# Patient Record
Sex: MALE | Race: BLACK OR AFRICAN AMERICAN | NOT HISPANIC OR LATINO
[De-identification: names, ages, dates, MRNs, and addresses within clinical notes are randomized per-mention and may not be internally consistent; named-entity substitution may affect disease eponyms.]

---

## 2018-01-10 PROBLEM — Z00.00 ENCOUNTER FOR PREVENTIVE HEALTH EXAMINATION: Status: ACTIVE | Noted: 2018-01-10

## 2018-01-16 ENCOUNTER — APPOINTMENT (OUTPATIENT)
Dept: CARDIOLOGY | Facility: CLINIC | Age: 63
End: 2018-01-16
Payer: COMMERCIAL

## 2018-01-16 VITALS
SYSTOLIC BLOOD PRESSURE: 130 MMHG | DIASTOLIC BLOOD PRESSURE: 68 MMHG | WEIGHT: 190 LBS | BODY MASS INDEX: 28.14 KG/M2 | HEART RATE: 64 BPM | HEIGHT: 69 IN

## 2018-01-16 DIAGNOSIS — Z86.79 PERSONAL HISTORY OF OTHER DISEASES OF THE CIRCULATORY SYSTEM: ICD-10-CM

## 2018-01-16 DIAGNOSIS — Z87.19 PERSONAL HISTORY OF OTHER DISEASES OF THE DIGESTIVE SYSTEM: ICD-10-CM

## 2018-01-16 DIAGNOSIS — Z86.39 PERSONAL HISTORY OF OTHER ENDOCRINE, NUTRITIONAL AND METABOLIC DISEASE: ICD-10-CM

## 2018-01-16 DIAGNOSIS — Z87.891 PERSONAL HISTORY OF NICOTINE DEPENDENCE: ICD-10-CM

## 2018-01-16 PROCEDURE — 99214 OFFICE O/P EST MOD 30 MIN: CPT

## 2018-01-16 RX ORDER — COLCHICINE 0.6 MG/1
0.6 CAPSULE ORAL
Refills: 0 | Status: ACTIVE | COMMUNITY

## 2018-01-29 ENCOUNTER — APPOINTMENT (OUTPATIENT)
Dept: CARDIOLOGY | Facility: CLINIC | Age: 63
End: 2018-01-29
Payer: COMMERCIAL

## 2018-01-29 PROCEDURE — 93880 EXTRACRANIAL BILAT STUDY: CPT

## 2018-01-29 PROCEDURE — 93306 TTE W/DOPPLER COMPLETE: CPT

## 2018-01-30 ENCOUNTER — APPOINTMENT (OUTPATIENT)
Dept: CARDIOLOGY | Facility: CLINIC | Age: 63
End: 2018-01-30
Payer: COMMERCIAL

## 2018-01-30 VITALS
SYSTOLIC BLOOD PRESSURE: 146 MMHG | HEIGHT: 69 IN | HEART RATE: 58 BPM | WEIGHT: 191 LBS | DIASTOLIC BLOOD PRESSURE: 64 MMHG | BODY MASS INDEX: 28.29 KG/M2

## 2018-01-30 PROCEDURE — 99214 OFFICE O/P EST MOD 30 MIN: CPT

## 2019-01-08 ENCOUNTER — APPOINTMENT (OUTPATIENT)
Dept: CARDIOLOGY | Facility: CLINIC | Age: 64
End: 2019-01-08

## 2019-01-08 ENCOUNTER — MEDICATION RENEWAL (OUTPATIENT)
Age: 64
End: 2019-01-08

## 2019-01-29 ENCOUNTER — APPOINTMENT (OUTPATIENT)
Dept: CARDIOLOGY | Facility: CLINIC | Age: 64
End: 2019-01-29
Payer: COMMERCIAL

## 2019-01-29 ENCOUNTER — NON-APPOINTMENT (OUTPATIENT)
Age: 64
End: 2019-01-29

## 2019-01-29 VITALS
OXYGEN SATURATION: 94 % | WEIGHT: 190 LBS | SYSTOLIC BLOOD PRESSURE: 142 MMHG | HEART RATE: 64 BPM | BODY MASS INDEX: 28.14 KG/M2 | DIASTOLIC BLOOD PRESSURE: 70 MMHG | HEIGHT: 69 IN

## 2019-01-29 PROCEDURE — 99214 OFFICE O/P EST MOD 30 MIN: CPT

## 2019-01-29 PROCEDURE — 93306 TTE W/DOPPLER COMPLETE: CPT

## 2019-01-29 PROCEDURE — 93000 ELECTROCARDIOGRAM COMPLETE: CPT

## 2019-01-29 NOTE — ASSESSMENT
[FreeTextEntry1] : Results of echocardiogram  discussed with the patient. Patient had mitral valve prolapse. There is moderate mitral insufficiency. There is mild pulmonary hypertension. Patient is asymptomatic.  Cardiac followup yearly and as needed.

## 2019-01-29 NOTE — PHYSICAL EXAM
[General Appearance - Well Developed] : well developed [Normal Appearance] : normal appearance [Well Groomed] : well groomed [General Appearance - Well Nourished] : well nourished [No Deformities] : no deformities [General Appearance - In No Acute Distress] : no acute distress [Normal Conjunctiva] : the conjunctiva exhibited no abnormalities [Eyelids - No Xanthelasma] : the eyelids demonstrated no xanthelasmas [Normal Oral Mucosa] : normal oral mucosa [No Oral Pallor] : no oral pallor [No Oral Cyanosis] : no oral cyanosis [Normal Jugular Venous A Waves Present] : normal jugular venous A waves present [Normal Jugular Venous V Waves Present] : normal jugular venous V waves present [No Jugular Venous Nicole A Waves] : no jugular venous nicole A waves [Respiration, Rhythm And Depth] : normal respiratory rhythm and effort [Exaggerated Use Of Accessory Muscles For Inspiration] : no accessory muscle use [Auscultation Breath Sounds / Voice Sounds] : lungs were clear to auscultation bilaterally [Heart Rate And Rhythm] : heart rate and rhythm were normal [Heart Sounds] : normal S1 and S2 [Murmurs] : no murmurs present [Abdomen Soft] : soft [Abdomen Tenderness] : non-tender [Abdomen Mass (___ Cm)] : no abdominal mass palpated [Abnormal Walk] : normal gait [Gait - Sufficient For Exercise Testing] : the gait was sufficient for exercise testing [Nail Clubbing] : no clubbing of the fingernails [Cyanosis, Localized] : no localized cyanosis [Petechial Hemorrhages (___cm)] : no petechial hemorrhages [Skin Color & Pigmentation] : normal skin color and pigmentation [] : no rash [No Venous Stasis] : no venous stasis [Skin Lesions] : no skin lesions [No Skin Ulcers] : no skin ulcer [No Xanthoma] : no  xanthoma was observed [Oriented To Time, Place, And Person] : oriented to person, place, and time [Affect] : the affect was normal [Mood] : the mood was normal [No Anxiety] : not feeling anxious

## 2019-01-29 NOTE — HISTORY OF PRESENT ILLNESS
[FreeTextEntry1] : The patient is presenting here today for cardiac follow up.  The patient is 63 years old male with a history of hypertension and hyperlipidemia. Patient had atypical chest pain in the past, patient had nuclear stress test in the past which revealed no evidence of ischemia. Patient is physically active.Patient is here to review results of echocardiogram .

## 2020-01-28 ENCOUNTER — APPOINTMENT (OUTPATIENT)
Dept: CARDIOLOGY | Facility: CLINIC | Age: 65
End: 2020-01-28

## 2020-02-27 ENCOUNTER — NON-APPOINTMENT (OUTPATIENT)
Age: 65
End: 2020-02-27

## 2020-02-27 ENCOUNTER — APPOINTMENT (OUTPATIENT)
Dept: CARDIOLOGY | Facility: CLINIC | Age: 65
End: 2020-02-27
Payer: MEDICARE

## 2020-02-27 VITALS
WEIGHT: 188 LBS | HEART RATE: 69 BPM | BODY MASS INDEX: 27.85 KG/M2 | HEIGHT: 69 IN | OXYGEN SATURATION: 93 % | DIASTOLIC BLOOD PRESSURE: 60 MMHG | SYSTOLIC BLOOD PRESSURE: 120 MMHG

## 2020-02-27 PROCEDURE — 99214 OFFICE O/P EST MOD 30 MIN: CPT

## 2020-02-27 PROCEDURE — 93000 ELECTROCARDIOGRAM COMPLETE: CPT

## 2020-02-27 RX ORDER — IBUPROFEN 800 MG/1
800 TABLET, FILM COATED ORAL
Refills: 0 | Status: DISCONTINUED | COMMUNITY
End: 2020-02-27

## 2020-02-27 NOTE — HISTORY OF PRESENT ILLNESS
[FreeTextEntry1] : Mr. Tapia is a 64 year old male that came in today 2-27-20 for routine follow up. \par \par He likes to ride his bike when the weather is nice, but has been limited. He last rode his bike last week 15-30minutes. Asymptotic. \par He does side  work and states he is constantly moving. \par \par He denies chest pain, sob, palpitations, dizziness, syncope, orthopnea and PND. \par \par As you know he has a history of hypertension (PCP added Norvasc aprox 6months ago due to persistent hypertension) and hyperlipidemia. Patient had atypical chest pain in the past, and f/u nuclear stress test revealed no evidence of ischemia. \par \par Labs/tests\par echocardiogram 1-29-19 ef 60% with normal ventricular function, mild diastolic dysfunction, mildly dilated LA, bileaflet MVP, mild MR, mild TR, mild NE, mild PHTN PASP 42mmhg. \par \par Carotid ultrasound mild non-obstuctive diseaes b/l\par \par Nuclear 6-13-13 pt exercised x 8min reaching 98% of MPHR with normal myocardial perfusion and wall motion study

## 2020-02-27 NOTE — ASSESSMENT
[FreeTextEntry1] : PLAN 2-27-20\par \par -CAD risk factors abnormal ekg, age, gender, hypertension and borderline dyslipidemia. Echo as above. 1-29-19 ef 60% with normal ventricular function. Nuclear stress test 6-13-13 pt exercised 8min reaching 98% of MPHR. Given his lack of formal exercis regimen and risk factors as above we have recommended an echocardiogram to further evaluate EF, ventricular wall motion, valvular function, chamber size and valvular pathology in addition to a s-echo to follow symptoms, ekg changes,  hr/bp response and peak pulmonary pressures to exertion. He is currenlty stable. \par \par -VHD with noted 1-2/6 shahab at apex. Advising echocardiogram as above to follow valvular pathology. \par \par -HTN. Controlled on meds as above. Requesting most recent labs from PCP\par \par -PVD. Carotid ultrasound as above with mild non-obstructive disease b/l. On Asa and zetia (requesting lipid panel)\par \par -requesting most recent lipid panel from PCP. Noted to be on Zetia\par \par f/u after tesing, sooner if changes in symptoms or status\par \par Sincerely,\par \par Mary Springer PA-C\par patient reviewed and plan advised by supervising physician Dr. Prater\par

## 2020-02-27 NOTE — PHYSICAL EXAM
[Normal Appearance] : normal appearance [No Deformities] : no deformities [] : no respiratory distress [Respiration, Rhythm And Depth] : normal respiratory rhythm and effort [Abdomen Tenderness] : non-tender [Bowel Sounds] : normal bowel sounds [Abdomen Soft] : soft [Mood] : the mood was normal [Skin Color & Pigmentation] : normal skin color and pigmentation [Affect] : the affect was normal [FreeTextEntry1] : no edema 2+ noted b/l l/e

## 2020-03-05 ENCOUNTER — APPOINTMENT (OUTPATIENT)
Dept: CARDIOLOGY | Facility: CLINIC | Age: 65
End: 2020-03-05
Payer: MEDICARE

## 2020-03-05 PROCEDURE — 93306 TTE W/DOPPLER COMPLETE: CPT

## 2020-08-06 ENCOUNTER — APPOINTMENT (OUTPATIENT)
Dept: CARDIOLOGY | Facility: CLINIC | Age: 65
End: 2020-08-06

## 2020-08-20 ENCOUNTER — APPOINTMENT (OUTPATIENT)
Dept: CARDIOLOGY | Facility: CLINIC | Age: 65
End: 2020-08-20

## 2020-09-01 RX ORDER — AMLODIPINE BESYLATE 10 MG/1
10 TABLET ORAL DAILY
Qty: 90 | Refills: 1 | Status: ACTIVE | COMMUNITY
Start: 1900-01-01 | End: 1900-01-01

## 2023-01-10 ENCOUNTER — APPOINTMENT (OUTPATIENT)
Dept: CARDIOLOGY | Facility: CLINIC | Age: 68
End: 2023-01-10
Payer: MEDICARE

## 2023-01-10 ENCOUNTER — NON-APPOINTMENT (OUTPATIENT)
Age: 68
End: 2023-01-10

## 2023-01-10 VITALS
OXYGEN SATURATION: 97 % | SYSTOLIC BLOOD PRESSURE: 130 MMHG | BODY MASS INDEX: 27.11 KG/M2 | WEIGHT: 183 LBS | TEMPERATURE: 98 F | HEIGHT: 69 IN | HEART RATE: 58 BPM | DIASTOLIC BLOOD PRESSURE: 70 MMHG

## 2023-01-10 DIAGNOSIS — Z01.810 ENCOUNTER FOR PREPROCEDURAL CARDIOVASCULAR EXAMINATION: ICD-10-CM

## 2023-01-10 PROCEDURE — 99214 OFFICE O/P EST MOD 30 MIN: CPT | Mod: 25

## 2023-01-10 PROCEDURE — 93000 ELECTROCARDIOGRAM COMPLETE: CPT

## 2023-01-10 RX ORDER — RANITIDINE 75 MG/1
TABLET ORAL
Refills: 0 | Status: DISCONTINUED | COMMUNITY
End: 2023-01-10

## 2023-01-10 NOTE — ASSESSMENT
[FreeTextEntry1] : Results of echocardiogram  discussed with the patient. Patient had mitral valve prolapse. There is moderate mitral insufficiency. There is mild pulmonary hypertension. Patient is asymptomatic.  Cardiac followup yearly and as needed.\par Patient is preop for colonoscopy.  ECG reviewed.  Overall patient is well compensated.  No significant risk for cardiovascular events during anticipated low risk procedure.  Hold aspirin for 7 days prior to procedure.  Restart aspirin as soon as possible after the procedure.

## 2024-01-18 ENCOUNTER — NON-APPOINTMENT (OUTPATIENT)
Age: 69
End: 2024-01-18

## 2024-01-18 ENCOUNTER — APPOINTMENT (OUTPATIENT)
Dept: CARDIOLOGY | Facility: CLINIC | Age: 69
End: 2024-01-18
Payer: MEDICARE

## 2024-01-18 VITALS
HEART RATE: 59 BPM | BODY MASS INDEX: 27.11 KG/M2 | DIASTOLIC BLOOD PRESSURE: 60 MMHG | OXYGEN SATURATION: 99 % | SYSTOLIC BLOOD PRESSURE: 136 MMHG | HEIGHT: 69 IN | WEIGHT: 183 LBS

## 2024-01-18 DIAGNOSIS — R94.31 ABNORMAL ELECTROCARDIOGRAM [ECG] [EKG]: ICD-10-CM

## 2024-01-18 PROCEDURE — 99204 OFFICE O/P NEW MOD 45 MIN: CPT | Mod: 25

## 2024-01-18 PROCEDURE — 93000 ELECTROCARDIOGRAM COMPLETE: CPT

## 2024-01-18 NOTE — REASON FOR VISIT
[CV Risk Factors and Non-Cardiac Disease] : CV risk factors and non-cardiac disease [Structural Heart and Valve Disease] : structural heart and valve disease [Hyperlipidemia] : hyperlipidemia [Hypertension] : hypertension [FreeTextEntry3] : Dr. Martell [FreeTextEntry1] : Patient is a 68-year-old white male who presents the office today for his annual cardiac evaluation.  Patient previously under the care of Dr. Prater who retired and presents today for his annual cardiac evaluation fortunately with no new or active cardiovascular complaints.

## 2024-01-18 NOTE — PHYSICAL EXAM
[Well Developed] : well developed [Well Nourished] : well nourished [No Acute Distress] : no acute distress [Normal Conjunctiva] : normal conjunctiva [Normal Venous Pressure] : normal venous pressure [No Carotid Bruit] : no carotid bruit [Normal S1, S2] : normal S1, S2 [Clear Lung Fields] : clear lung fields [No Respiratory Distress] : no respiratory distress  [Soft] : abdomen soft [Non Tender] : non-tender [No Masses/organomegaly] : no masses/organomegaly [Normal] : normal gait [No Edema] : no edema [No Cyanosis] : no cyanosis [No Focal Deficits] : no focal deficits [de-identified] : Grade 2/6 apical MR murmur with a click

## 2024-01-18 NOTE — REVIEW OF SYSTEMS
[SOB] : no shortness of breath [Dyspnea on exertion] : not dyspnea during exertion [Chest Discomfort] : no chest discomfort [Lower Ext Edema] : no extremity edema [Leg Claudication] : no intermittent leg claudication [Palpitations] : no palpitations [Orthopnea] : no orthopnea [PND] : no PND [Syncope] : no syncope [Cough] : no cough [Wheezing] : no wheezing [Coughing Up Blood] : no hemoptysis [Snoring] : snoring [Negative] : Neurological

## 2024-01-18 NOTE — CARDIOLOGY SUMMARY
[de-identified] : (1/18/2024) EKG: Sinus bradycardia with first-degree AV block at a rate of 52 bpm.  Frontal QRS axis of -15 degrees.  Possible inferior MI indeterminate age [de-identified] : (3/5/2020) ECHOCARDIOGRAPHIC CONCLUSION. Moderate eccentric mitral regurgitation.  Mildly dilated left atrium with a volume of 39 cc/m.  Normal left ventricular systolic function with an EF of approximately 60%..  Mild diastolic dysfunction.  PA systolic pressure 55 assuming RA of 10 consistent with moderate pulmonary hypertension.  Moderate eccentric tricuspid regurgitation.  Mild pulmonic regurgitation trivial pericardial effusion.

## 2024-01-18 NOTE — DISCUSSION/SUMMARY
[FreeTextEntry1] : Patient very pleasant 68-year-old male with known hypertension, hyperlipidemia but statin intolerance remains on Zetia.  Last blood work triglycerides were elevated but lipids were acceptable.  He is active exercising mostly on a cycle for a limited period of time but remains asymptomatic.  Patient's last stress test was nearly 10 years ago.  His last echo was several years ago and notable for moderately elevated pulmonary pressures.  He has no signs or symptoms of right heart failure but was advised he should update his echocardiogram to evaluate his mitral regurgitation, the effects hypertension is having on his heart as well as reevaluate his pulmonary pressures and search for an etiology should they be more elevated.  Patient was reassured based on his exam today, interval cardiac review of systems, electrocardiogram and asymptomatic status.  He was recommended return in the next 2 months for an updated echocardiogram after which stress testing will be considered.  No other changes were made to his medical regimen he was informed that he is an acceptable candidate to proceed with another carpal tunnel syndrome surgery and otherwise no changes were made to his medical regimen.  A prescription for full set of laboratory data was provided once these labs are obtained and reviewed he will be called and advised accordingly.  Joel Goldberg, MD, FACC [EKG obtained to assist in diagnosis and management of assessed problem(s)] : EKG obtained to assist in diagnosis and management of assessed problem(s)

## 2024-01-18 NOTE — HISTORY OF PRESENT ILLNESS
[FreeTextEntry1] : Patient very pleasant 68-year-old white male with known mitral valve prolapse, mitral regurgitation known hypertension, hyperlipidemia with statin intolerance who presents to the office today for his annual cardiac evaluation.  Patient notes that he is been feeling well.  He is without active cardiac symptoms he had carpal tunnels surgery 3 months ago and needs yet another procedure.  He tolerated the operation and perioperative period without any compromise.  He notes that he may be having upcoming surgery soon.    Patient notes that he has been active,he bikes but on a very limited fashion for 3 to 5 miles.  During that time he denies chest discomfort, shortness of breath, dizziness or lightheadedness, he has no PND orthopnea and is taking all of his medication.  He previously was statin intolerant and remains on Zetia only there are no recent labs but last December cholesterol 184 HDL 48 LDL 97 and triglycerides 194.  Patient presents today otherwise looking and feeling well with no new or active concerns or complaints

## 2024-03-20 ENCOUNTER — APPOINTMENT (OUTPATIENT)
Dept: CARDIOLOGY | Facility: CLINIC | Age: 69
End: 2024-03-20
Payer: MEDICARE

## 2024-03-20 VITALS
OXYGEN SATURATION: 96 % | HEART RATE: 65 BPM | SYSTOLIC BLOOD PRESSURE: 102 MMHG | WEIGHT: 185 LBS | BODY MASS INDEX: 27.32 KG/M2 | DIASTOLIC BLOOD PRESSURE: 54 MMHG

## 2024-03-20 DIAGNOSIS — I10 ESSENTIAL (PRIMARY) HYPERTENSION: ICD-10-CM

## 2024-03-20 DIAGNOSIS — E78.5 HYPERLIPIDEMIA, UNSPECIFIED: ICD-10-CM

## 2024-03-20 DIAGNOSIS — I34.1 NONRHEUMATIC MITRAL (VALVE) PROLAPSE: ICD-10-CM

## 2024-03-20 DIAGNOSIS — I34.0 NONRHEUMATIC MITRAL (VALVE) INSUFFICIENCY: ICD-10-CM

## 2024-03-20 PROCEDURE — 93306 TTE W/DOPPLER COMPLETE: CPT

## 2024-03-20 PROCEDURE — 99214 OFFICE O/P EST MOD 30 MIN: CPT

## 2024-03-20 RX ORDER — EZETIMIBE 10 MG/1
10 TABLET ORAL
Refills: 0 | Status: ACTIVE | COMMUNITY

## 2024-03-20 RX ORDER — HYDROCHLOROTHIAZIDE 25 MG/1
25 TABLET ORAL
Refills: 0 | Status: ACTIVE | COMMUNITY

## 2024-03-20 RX ORDER — ALLOPURINOL 300 MG/1
300 TABLET ORAL
Refills: 0 | Status: ACTIVE | COMMUNITY

## 2024-03-20 RX ORDER — HYDROXYZINE HYDROCHLORIDE 25 MG/1
25 TABLET ORAL
Refills: 0 | Status: ACTIVE | COMMUNITY

## 2024-03-20 RX ORDER — NEBIVOLOL 20 MG/1
20 TABLET ORAL
Refills: 0 | Status: ACTIVE | COMMUNITY

## 2024-03-20 RX ORDER — TERAZOSIN 10 MG/1
10 CAPSULE ORAL
Refills: 0 | Status: ACTIVE | COMMUNITY

## 2024-03-20 RX ORDER — ASPIRIN 81 MG
81 TABLET, DELAYED RELEASE (ENTERIC COATED) ORAL
Refills: 0 | Status: ACTIVE | COMMUNITY

## 2024-03-20 RX ORDER — FAMOTIDINE 40 MG/1
40 TABLET, FILM COATED ORAL
Refills: 0 | Status: ACTIVE | COMMUNITY

## 2024-03-25 PROBLEM — I34.1 MITRAL VALVE PROLAPSE: Status: ACTIVE | Noted: 2018-01-30

## 2024-03-25 PROBLEM — I10 BENIGN ESSENTIAL HTN: Status: ACTIVE | Noted: 2018-01-16

## 2024-03-25 PROBLEM — E78.5 BORDERLINE HYPERLIPIDEMIA: Status: ACTIVE | Noted: 2018-01-16

## 2024-03-25 PROBLEM — I34.0 MITRAL REGURGITATION: Status: ACTIVE | Noted: 2018-01-30

## 2024-03-25 NOTE — REVIEW OF SYSTEMS
[Snoring] : snoring [Negative] : Integumentary [Dyspnea on exertion] : not dyspnea during exertion [SOB] : no shortness of breath [Chest Discomfort] : no chest discomfort [Lower Ext Edema] : no extremity edema [Leg Claudication] : no intermittent leg claudication [Palpitations] : no palpitations [PND] : no PND [Orthopnea] : no orthopnea [Syncope] : no syncope [Cough] : no cough [Coughing Up Blood] : no hemoptysis [Wheezing] : no wheezing

## 2024-03-25 NOTE — ADDENDUM
[FreeTextEntry1] : Patient's recent laboratory data also reviewed and he was commended for his efforts as his cholesterol is at 169 with an LDL of 95 triglycerides 155 HDL 43 remainder blood work including an A1c of 5.3 and normal thyroid function test the T4-5 5 TSH 1.98

## 2024-03-25 NOTE — REASON FOR VISIT
[CV Risk Factors and Non-Cardiac Disease] : CV risk factors and non-cardiac disease [Structural Heart and Valve Disease] : structural heart and valve disease [Hyperlipidemia] : hyperlipidemia [Hypertension] : hypertension [FreeTextEntry1] : Patient is a 68-year-old white male who presents the office today for a brief interval follow-up and an updated echocardiogram to follow progression of his known mitral regurgitation and to evaluate whether or not his pulmonary pressures (which were as high as 55 at the time of his last evaluation) remained elevated or have dropped.    He presents today otherwise looking and feeling well with no new or active complaints other than having COVID in February which he is completely resolved the symptoms of.  Since his last visit he has had carpal tunnel surgery with no major issues.   [FreeTextEntry3] : Dr. Martell

## 2024-03-25 NOTE — DISCUSSION/SUMMARY
[EKG obtained to assist in diagnosis and management of assessed problem(s)] : EKG obtained to assist in diagnosis and management of assessed problem(s) [FreeTextEntry1] : Patient very pleasant 68-year-old male with known hypertension, hyperlipidemia but statin intolerance remains on Zetia with lipids borderline.  He has known mitral prolapse with mitral Goethe Tatian of 42% regurgitant fraction but preserved LV dimensions function and pulmonary pressures have now dropped to 35.  Patient was reassured and advised to continue an aggressive carbohydrate restricted diet, lose weight do his best to avoid additional medication.  Patient was given the good news that his pulmonary pressures have dropped but also it was discussed possible other contributing factors i.e. sleep apnea but he has no daytime somnolence, looks and feels well he is on appropriate afterload with [place] and as well as amlodipine the rationale for these medications was explained.  He appears to be comfortable and will continue to exercise more aggressive fashion going forward.  No changes were made to his medical regimen and he was advised to return to the office in 2 months for a stress echo.  At the time of his last visit patient was informed that his last stress test was nearly 10 years ago and I advised that he scheduled as a stress echo to evaluate whether his LV augments or has lost his reserve to help guide further management.  Joel Goldberg, MD, FACC

## 2024-03-25 NOTE — HISTORY OF PRESENT ILLNESS
[FreeTextEntry1] : Patient very pleasant 68-year-old white male with known mitral valve prolapse, mitral regurgitation, known hypertension, hyperlipidemia with statin intolerance who is on Zetia alone who presents to the office today for a brief interval evaluation to update his echocardiogram which was recommended at the time of his initial visit with me on January 18.  Patient notes that he is been feeling well. He is without active cardiac symptoms he had carpal tunnels surgery 3 months ago and is planning on a second procedure shortly.  When he was last here there were no historical features suggestive of other than routine anesthetic and risk and he he tolerated the operation and perioperative period without any compromise. He noted that he was planning to get another operation shortly.  Patient notes that he remains active he bikes on a regular basis 3 to 5 miles regularly during which time he denies all cardiac symptomatology.  In the past his pulmonary pressures have been as high as 55 and on his echo today his pulmonary pressures dropped to 35.  His regurgitant fraction was calculated at 42% and there was posterior leaflet prolapse with MR and a preserved ejection fraction of 60%.  Patient's echo was reviewed in detail with him and compared with prior study and he was reassured of his structural and functional stability and informed that he needs to stay on all of the medication as his pulmonary pressures have come down significantly and he has no symptoms.  He was questioned about the possibility of sleep apnea and that was discussed as a possible etiology for his previously elevated pulmonary pressures and advised again to lose weight.  At the time of his last visit it was discussed that he previously was statin intolerant and remains on Zetia his most recent labs were from December notable for a cholesterol of 184 HDL 48 LDL 97 and triglycerides 194..  Exercise and carbohydrate restriction was reviewed in great detail his labs be reevaluated at his next formal visit

## 2024-03-25 NOTE — PHYSICAL EXAM
[Well Developed] : well developed [Well Nourished] : well nourished [No Acute Distress] : no acute distress [Normal Conjunctiva] : normal conjunctiva [Normal Venous Pressure] : normal venous pressure [No Carotid Bruit] : no carotid bruit [Normal S1, S2] : normal S1, S2 [Clear Lung Fields] : clear lung fields [No Respiratory Distress] : no respiratory distress  [Soft] : abdomen soft [Non Tender] : non-tender [No Masses/organomegaly] : no masses/organomegaly [Normal] : normal gait [No Edema] : no edema [No Cyanosis] : no cyanosis [No Focal Deficits] : no focal deficits [de-identified] : Grade 2/6 apical MR murmur with a click

## 2024-03-25 NOTE — CARDIOLOGY SUMMARY
[de-identified] : 3/20/2024 EKG: Sinus bradycardia 52 beats minute frontal QRS axis -15 degrees poor R wave progression. [de-identified] : (3/20/2024) ECHOCARDIOGRAPHIC CONCLUSIONS:  Left ventricular systolic function is normal with an ejection fraction visually estimated at 60 %. The left ventricular diastolic function is indeterminate. The left atrium is mildly dilated. Prolapse of the posterior mitral leaflet. Moderate mitral regurgitation. The mitral regurgitant jet is eccentrically directed. RF is 42%. Moderate tricuspid regurgitation. There is an eccentric tricuspid regurgitant jet. Trace pulmonic regurgitation. Interatrial septum is aneurysmal. No pericardial effusion seen. Estimated pulmonary artery systolic pressure is 35 mmHg, consistent with mild pulmonary hypertension. Compared to the transthoracic echocardiogram performed on 3/5/2020, decrease in PASP noted.

## 2024-03-27 ENCOUNTER — NON-APPOINTMENT (OUTPATIENT)
Age: 69
End: 2024-03-27

## 2024-07-11 ENCOUNTER — NON-APPOINTMENT (OUTPATIENT)
Age: 69
End: 2024-07-11

## 2024-07-11 ENCOUNTER — APPOINTMENT (OUTPATIENT)
Dept: CARDIOLOGY | Facility: CLINIC | Age: 69
End: 2024-07-11
Payer: MEDICARE

## 2024-07-11 VITALS
DIASTOLIC BLOOD PRESSURE: 60 MMHG | OXYGEN SATURATION: 97 % | SYSTOLIC BLOOD PRESSURE: 110 MMHG | HEART RATE: 65 BPM | WEIGHT: 182 LBS | BODY MASS INDEX: 26.88 KG/M2

## 2024-07-11 DIAGNOSIS — I34.0 NONRHEUMATIC MITRAL (VALVE) INSUFFICIENCY: ICD-10-CM

## 2024-07-11 DIAGNOSIS — I34.1 NONRHEUMATIC MITRAL (VALVE) PROLAPSE: ICD-10-CM

## 2024-07-11 DIAGNOSIS — I10 ESSENTIAL (PRIMARY) HYPERTENSION: ICD-10-CM

## 2024-07-11 DIAGNOSIS — I27.20 PULMONARY HYPERTENSION, UNSPECIFIED: ICD-10-CM

## 2024-07-11 PROCEDURE — 99214 OFFICE O/P EST MOD 30 MIN: CPT

## 2024-07-11 PROCEDURE — 93000 ELECTROCARDIOGRAM COMPLETE: CPT

## 2024-07-11 PROCEDURE — 93351 STRESS TTE COMPLETE: CPT

## 2024-08-09 ENCOUNTER — APPOINTMENT (OUTPATIENT)
Dept: CARDIOLOGY | Facility: CLINIC | Age: 69
End: 2024-08-09

## 2024-08-09 PROCEDURE — 99213 OFFICE O/P EST LOW 20 MIN: CPT

## 2024-08-09 NOTE — HISTORY OF PRESENT ILLNESS
[FreeTextEntry1] : Patient very pleasant 69-year-old white male with known mitral valve prolapse, mitral regurgitation, known hypertension with mildly elevated pulmonary pressures, hyperlipidemia with statin intolerance who is on Zetia alone who presents to the office today for follow-up after having had a stress echo that he performed in an asymmetric fashion but was notable for marked elevation of pulmonary pressures with preserved ejection fraction augmentation of all segments.  He presents today after having his alpha-blocker increased, notes that he is tolerating it well and has no new active concerns or complaints today.  He is exercising on a regular basis and feels better with his routine activities.   Patient notes that he is been feeling well. He is without active cardiac symptoms he had carpal tunnel surgery 3 months ago and is planning on a second procedure shortly. When he was last here there were no historical features suggestive of other than routine anesthetic and risk and he he tolerated the operation and perioperative period without any compromise. He noted that he was planning to get another operation shortly.  Patient notes that he remains active he bikes on a regular basis 3 to 5 miles regularly during which time he denies all cardiac symptomatology. In the past his pulmonary pressures have been as high as 55 and on his stress echo today his pulmonary pressures ada to 58 but on baseline echo had dropped down to 35 after previously adjusting  his antihypertensive regimen.. His regurgitant fraction was calculated at 42% and there was posterior leaflet prolapse with MR and a preserved ejection fraction of 60% on his baseline echo.  Patient's echo was reviewed in detail with him and compared with prior study and he was reassured of his structural and functional stability and informed that he needs to stay on all of the medication as his pulmonary pressures have come down significantly and he has no symptoms. He was questioned about the possibility of sleep apnea and that was discussed as a possible etiology. He notes that he previously was evaluated but currently does not wear a sleep mask.  He notes he is currently in the middle of having yet another sleep study possibly relevant to his baseline elevation of pulmonary pressures if it is not his mitral regurg alone.  At the time of his last visit it was discussed that he previously was statin intolerant and remains on Zetia his most recent labs were from December notable for a cholesterol of 184 HDL 48 LDL 97 and triglycerides 194.. Exercise and carbohydrate restriction was reviewed in great detail his labs be reevaluated at his next formal visit he is due for more blood work and will have it done at his primary care physician before his next visit

## 2024-08-09 NOTE — DISCUSSION/SUMMARY
[FreeTextEntry1] : Patient very pleasant 69-year-old male with known hypertension, hyperlipidemia but statin intolerance remains on Zetia with lipids borderline. He has known mitral prolapse with mitral regurgitation of 42% regurgitant fraction but preserved LV dimensions function and pulmonary pressures have now dropped to 35 at rest but went to 61 with exertion on today's stress echo.  He was without significant other symptomatology and his study was negative for ischemia..  Increasing afterload reduction was implemented at the time of his last visit, he is tolerating it well, denies dizziness or lightheadedness and admits that some of his activities seem to be easier but has no negative or untoward effects related to increasing afterload.  He understands complete the rationale behind this approach to minimize his long-term effects of mitral regurgitation.  Patient also notes he is pursuing a sleep apnea workup for what ever contribution to pulmonary pressures that may be contributing.  Patient was reassured regarding his cardiovascular status and at this point in time no changes were made to his medical regimen.  Return to the office in 3 months time and likely have an updated echocardiogram at 6 months.  Joel Goldberg, MD, FACC .

## 2024-08-09 NOTE — REVIEW OF SYSTEMS
[Snoring] : snoring [Negative] : Neurological [SOB] : no shortness of breath [Dyspnea on exertion] : not dyspnea during exertion [Chest Discomfort] : no chest discomfort [Lower Ext Edema] : no extremity edema [Leg Claudication] : no intermittent leg claudication [Palpitations] : no palpitations [Orthopnea] : no orthopnea [PND] : no PND [Syncope] : no syncope [Cough] : no cough [Wheezing] : no wheezing [Coughing Up Blood] : no hemoptysis

## 2024-08-09 NOTE — REASON FOR VISIT
[FreeTextEntry1] : Patient is a 69-year-old white male who presents the office today to evaluate his response to increasing afterload therapy based upon recent stress echo that was notable for markedly elevated pulmonary pressures during exertion.  Patient was initially seen and evaluated recently with an  updated echocardiogram to follow progression of his known mitral regurgitation and to evaluate whether or not his pulmonary pressures (which were as high as 55 at the time of his last evaluation) remained elevated or have dropped.  On July 11 patient underwent a stress echo and was found to have augmentation of his ejection fraction to 65 to 70% which was originally 60 to 65% time of stress echo his pulmonary artery pressures were 58 mm which was his peak TR pressure gradient.  At that time he was recommended to increase his afterload and his alpha-blocker Terrazosin was increased to 10 twice daily.  Patient presents today looking and feeling well, he denies dizziness or lightheadedness he is tolerated the change and he understands the rationale.  He already was on amlodipine hydrochlorothiazide which was also increased from 25 daily to 25 alternating with 50 and he remains on nebivolol 20 mg every 12 hours.  Other findings notable on his  stress echo  failed to reveal any evidence of exercise-induced ischemia and his EF augmented from 60-60  65-70 with exercise without any evidence of ischemia or exercise-induced arrhythmias..

## 2024-08-09 NOTE — CARDIOLOGY SUMMARY
[de-identified] : (7/13/2024) STRESS ECHO CONCLUSIONS  1. Resting baseline LV ejection fraction was was estimated at approximately 60 to 65% (normal EF). Immediate post-stress the LV ejection fraction is was estimated at approximately 65 to 70% (normal EF). 2. Normal left ventricular segmental wall motion and systolic function at rest. Normal augmentation is noted post stress. 3. Mild pulmonary artery pressure at baseline. There is a moderate increase in pulmonary artery pressure immediately post-stress. PASP 61mmHg. 4. The patient underwent stress testing using the Standard Alejandro protocol. _ The patient exercised for 8 min 0 sec. Test was stopped due to target heart rate achieved and maximum exertion effort. _ The peak heart rate was 131 bpm: 87 % of predicted maximal heart rate for this patient. _ The patient achieved 9.7 METs which is consistent with good exercise capacity. 5. Resting ECG revealed sinus rhythm with NSST, PRWP. 6. At peak stress, the electrocardiogram revealed sinus tachycardia with no significant ischemic ST segment changes. 7. Negative for exercise induced ischemia by EKG and stress echo  ________________________________________________________________________________________ Performance and Exam Tolerance  Protocol: Standard Alejandro METS Achieved: 9.7 Stage Reached: 3 Exercise Duration: 8 min and 0 sec. Heart Rate: Rest: 66 bpm, Peak: 131 bpm (87 % max predicted) HR Response: Normal Blood Pressure: Rest: 130/62 mmHg, Peak: 146/82 mmHg. BP Response: Physiologic Exercise Capacity: Good Pretest Chest Pain: None Angina: No chest pain during exercise Symptoms During Stress: No symptoms Reason for Termination: Target heart rate achieved and Maximum exertion effort     ________________________________________________________________________________________ ELECTROCARDIOGRAPHIC FINDINGS:  Pre-Stress: At rest, the electrocardiogram revealed sinus rhythm with NSST, PRWP.  Post-Stress: At peak stress, the electrocardiogram revealed sinus tachycardia with no significant ischemic ST segment changes. PVCs during stress and recovery. V couplets durig stress. V triplets during stress and recovery. PAC in recovery. ________________________________________________________________________________________ FOCUSED ECHOCARDIOGRAPHIC FINDINGS:  Baseline Echocardiogram: Baseline/resting echocardiogram reveals normal left ventricular segmental wall motion and systolic function. At rest, the baseline left ventricular ejection fraction was was estimated at approximately 60 to 65% (normal EF). Mild pulmonary artery pressure at baseline.  Stress Echocardiogram: Stress echocardiogram reveals normal left ventricular segmental wall motion and systolic function. There is a moderate increase in pulmonary artery pressure immediatly post-stress. Immediate post-stress the left ventricular ejection fraction was estimated at approximately 65 to 70% (normal EF). PASP Oxygen Saturation: The oxygen saturation pre exercise was 96 %. The oxygen saturation post exercise was 96 %.  Tricuspid Regurgitation / RVSP: Rest     Stress TR Vmax:          3.81 m/s TR Peak Gradient: 58.0 mmHg PASP:             37.0 mmHg 61.0 mmHg    Summary section only: There is a moderate increase in pulmonary artery pressure immediatly post-stress. ________________________________________________________________________________________ Electronically signed on 7/11/2024 at 5:58:05 PM by Yohan Guadarrama

## 2024-08-09 NOTE — PHYSICAL EXAM
[Well Developed] : well developed [Well Nourished] : well nourished [No Acute Distress] : no acute distress [Normal Conjunctiva] : normal conjunctiva [Normal Venous Pressure] : normal venous pressure [No Carotid Bruit] : no carotid bruit [Normal S1, S2] : normal S1, S2 [Clear Lung Fields] : clear lung fields [No Respiratory Distress] : no respiratory distress  [Soft] : abdomen soft [Non Tender] : non-tender [No Masses/organomegaly] : no masses/organomegaly [Normal] : normal gait [No Edema] : no edema [No Cyanosis] : no cyanosis [No Focal Deficits] : no focal deficits [de-identified] : Grade 2/6 apical MR murmur with a click

## 2024-09-23 ENCOUNTER — APPOINTMENT (OUTPATIENT)
Dept: RADIOLOGY | Facility: CLINIC | Age: 69
End: 2024-09-23
Payer: MEDICARE

## 2024-09-23 ENCOUNTER — RESULT REVIEW (OUTPATIENT)
Age: 69
End: 2024-09-23

## 2024-09-23 PROCEDURE — 71046 X-RAY EXAM CHEST 2 VIEWS: CPT

## 2025-01-17 ENCOUNTER — APPOINTMENT (OUTPATIENT)
Dept: CARDIOLOGY | Facility: CLINIC | Age: 70
End: 2025-01-17
Payer: MEDICARE

## 2025-01-17 ENCOUNTER — NON-APPOINTMENT (OUTPATIENT)
Age: 70
End: 2025-01-17

## 2025-01-17 VITALS
HEIGHT: 69 IN | OXYGEN SATURATION: 98 % | DIASTOLIC BLOOD PRESSURE: 70 MMHG | WEIGHT: 177 LBS | HEART RATE: 82 BPM | SYSTOLIC BLOOD PRESSURE: 120 MMHG | BODY MASS INDEX: 26.22 KG/M2

## 2025-01-17 DIAGNOSIS — E78.5 HYPERLIPIDEMIA, UNSPECIFIED: ICD-10-CM

## 2025-01-17 DIAGNOSIS — I34.0 NONRHEUMATIC MITRAL (VALVE) INSUFFICIENCY: ICD-10-CM

## 2025-01-17 DIAGNOSIS — I10 ESSENTIAL (PRIMARY) HYPERTENSION: ICD-10-CM

## 2025-01-17 DIAGNOSIS — R00.2 PALPITATIONS: ICD-10-CM

## 2025-01-17 DIAGNOSIS — I34.1 NONRHEUMATIC MITRAL (VALVE) PROLAPSE: ICD-10-CM

## 2025-01-17 DIAGNOSIS — I27.20 PULMONARY HYPERTENSION, UNSPECIFIED: ICD-10-CM

## 2025-01-17 PROCEDURE — 99214 OFFICE O/P EST MOD 30 MIN: CPT

## 2025-01-17 PROCEDURE — 93000 ELECTROCARDIOGRAM COMPLETE: CPT | Mod: 59

## 2025-01-17 PROCEDURE — 93242 EXT ECG>48HR<7D RECORDING: CPT

## 2025-03-12 ENCOUNTER — NON-APPOINTMENT (OUTPATIENT)
Age: 70
End: 2025-03-12

## 2025-03-12 DIAGNOSIS — I34.1 NONRHEUMATIC MITRAL (VALVE) PROLAPSE: ICD-10-CM

## 2025-03-12 DIAGNOSIS — I10 ESSENTIAL (PRIMARY) HYPERTENSION: ICD-10-CM

## 2025-03-12 DIAGNOSIS — R00.2 PALPITATIONS: ICD-10-CM

## 2025-03-12 PROCEDURE — 93244 EXT ECG>48HR<7D REV&INTERPJ: CPT

## 2025-04-18 ENCOUNTER — NON-APPOINTMENT (OUTPATIENT)
Age: 70
End: 2025-04-18

## 2025-04-18 ENCOUNTER — APPOINTMENT (OUTPATIENT)
Dept: CARDIOLOGY | Facility: CLINIC | Age: 70
End: 2025-04-18
Payer: MEDICARE

## 2025-04-18 VITALS
WEIGHT: 182 LBS | BODY MASS INDEX: 26.88 KG/M2 | SYSTOLIC BLOOD PRESSURE: 116 MMHG | DIASTOLIC BLOOD PRESSURE: 60 MMHG | HEART RATE: 65 BPM | OXYGEN SATURATION: 94 %

## 2025-04-18 DIAGNOSIS — I27.20 PULMONARY HYPERTENSION, UNSPECIFIED: ICD-10-CM

## 2025-04-18 DIAGNOSIS — I10 ESSENTIAL (PRIMARY) HYPERTENSION: ICD-10-CM

## 2025-04-18 DIAGNOSIS — I34.1 NONRHEUMATIC MITRAL (VALVE) PROLAPSE: ICD-10-CM

## 2025-04-18 DIAGNOSIS — R00.2 PALPITATIONS: ICD-10-CM

## 2025-04-18 DIAGNOSIS — R94.31 ABNORMAL ELECTROCARDIOGRAM [ECG] [EKG]: ICD-10-CM

## 2025-04-18 PROCEDURE — 93000 ELECTROCARDIOGRAM COMPLETE: CPT

## 2025-04-18 PROCEDURE — 99214 OFFICE O/P EST MOD 30 MIN: CPT

## 2025-04-18 RX ORDER — TRIAMTERENE AND HYDROCHLOROTHIAZIDE 37.5; 25 MG/1; MG/1
37.5-25 CAPSULE ORAL
Qty: 90 | Refills: 3 | Status: ACTIVE | COMMUNITY
Start: 2025-04-18 | End: 1900-01-01

## 2025-07-17 ENCOUNTER — APPOINTMENT (OUTPATIENT)
Dept: CARDIOLOGY | Facility: CLINIC | Age: 70
End: 2025-07-17

## 2025-08-21 ENCOUNTER — APPOINTMENT (OUTPATIENT)
Dept: CARDIOLOGY | Facility: CLINIC | Age: 70
End: 2025-08-21
Payer: MEDICARE

## 2025-08-21 VITALS
OXYGEN SATURATION: 98 % | WEIGHT: 175 LBS | HEART RATE: 73 BPM | BODY MASS INDEX: 25.84 KG/M2 | SYSTOLIC BLOOD PRESSURE: 130 MMHG | DIASTOLIC BLOOD PRESSURE: 68 MMHG

## 2025-08-21 DIAGNOSIS — R00.2 PALPITATIONS: ICD-10-CM

## 2025-08-21 DIAGNOSIS — I27.20 PULMONARY HYPERTENSION, UNSPECIFIED: ICD-10-CM

## 2025-08-21 DIAGNOSIS — I34.1 NONRHEUMATIC MITRAL (VALVE) PROLAPSE: ICD-10-CM

## 2025-08-21 DIAGNOSIS — I10 ESSENTIAL (PRIMARY) HYPERTENSION: ICD-10-CM

## 2025-08-21 DIAGNOSIS — I34.0 NONRHEUMATIC MITRAL (VALVE) INSUFFICIENCY: ICD-10-CM

## 2025-08-21 PROCEDURE — 93320 DOPPLER ECHO COMPLETE: CPT

## 2025-08-21 PROCEDURE — 99214 OFFICE O/P EST MOD 30 MIN: CPT

## 2025-08-21 PROCEDURE — 93351 STRESS TTE COMPLETE: CPT
